# Patient Record
Sex: MALE | Race: WHITE | NOT HISPANIC OR LATINO | Employment: FULL TIME | URBAN - METROPOLITAN AREA
[De-identification: names, ages, dates, MRNs, and addresses within clinical notes are randomized per-mention and may not be internally consistent; named-entity substitution may affect disease eponyms.]

---

## 2023-11-17 ENCOUNTER — OFFICE VISIT (OUTPATIENT)
Dept: URGENT CARE | Facility: URGENT CARE | Age: 28
End: 2023-11-17
Payer: COMMERCIAL

## 2023-11-17 ENCOUNTER — ANCILLARY PROCEDURE (OUTPATIENT)
Dept: GENERAL RADIOLOGY | Facility: CLINIC | Age: 28
End: 2023-11-17
Attending: PHYSICIAN ASSISTANT
Payer: COMMERCIAL

## 2023-11-17 ENCOUNTER — VIRTUAL VISIT (OUTPATIENT)
Dept: URGENT CARE | Facility: CLINIC | Age: 28
End: 2023-11-17
Payer: COMMERCIAL

## 2023-11-17 VITALS
HEART RATE: 74 BPM | SYSTOLIC BLOOD PRESSURE: 144 MMHG | DIASTOLIC BLOOD PRESSURE: 76 MMHG | OXYGEN SATURATION: 97 % | TEMPERATURE: 97.8 F

## 2023-11-17 DIAGNOSIS — K59.00 CONSTIPATION, UNSPECIFIED CONSTIPATION TYPE: ICD-10-CM

## 2023-11-17 DIAGNOSIS — R10.84 ABDOMINAL PAIN, GENERALIZED: Primary | ICD-10-CM

## 2023-11-17 DIAGNOSIS — R10.84 ABDOMINAL PAIN, GENERALIZED: ICD-10-CM

## 2023-11-17 LAB
ALBUMIN SERPL-MCNC: 4 G/DL (ref 3.4–5)
ALBUMIN UR-MCNC: NEGATIVE MG/DL
ALP SERPL-CCNC: 66 U/L (ref 40–150)
ALT SERPL W P-5'-P-CCNC: 19 U/L (ref 0–70)
ANION GAP SERPL CALCULATED.3IONS-SCNC: 7 MMOL/L (ref 3–14)
APPEARANCE UR: CLEAR
AST SERPL W P-5'-P-CCNC: 22 U/L (ref 0–45)
BILIRUB SERPL-MCNC: 1 MG/DL (ref 0.2–1.3)
BILIRUB UR QL STRIP: NEGATIVE
BUN SERPL-MCNC: 12 MG/DL (ref 7–30)
CALCIUM SERPL-MCNC: 9.7 MG/DL (ref 8.5–10.1)
CHLORIDE BLD-SCNC: 104 MMOL/L (ref 94–109)
CO2 SERPL-SCNC: 32 MMOL/L (ref 20–32)
COLOR UR AUTO: YELLOW
CREAT SERPL-MCNC: 1 MG/DL (ref 0.66–1.25)
EGFRCR SERPLBLD CKD-EPI 2021: >90 ML/MIN/1.73M2
ERYTHROCYTE [DISTWIDTH] IN BLOOD BY AUTOMATED COUNT: 11.9 % (ref 10–15)
GLUCOSE BLD-MCNC: 104 MG/DL (ref 70–99)
GLUCOSE UR STRIP-MCNC: NEGATIVE MG/DL
HCT VFR BLD AUTO: 50.5 % (ref 40–53)
HGB BLD-MCNC: 17.1 G/DL (ref 13.3–17.7)
HGB UR QL STRIP: NEGATIVE
KETONES UR STRIP-MCNC: NEGATIVE MG/DL
LEUKOCYTE ESTERASE UR QL STRIP: NEGATIVE
MCH RBC QN AUTO: 29.5 PG (ref 26.5–33)
MCHC RBC AUTO-ENTMCNC: 33.9 G/DL (ref 31.5–36.5)
MCV RBC AUTO: 87 FL (ref 78–100)
NITRATE UR QL: NEGATIVE
PH UR STRIP: 7.5 [PH] (ref 5–7)
PLATELET # BLD AUTO: 247 10E3/UL (ref 150–450)
POTASSIUM BLD-SCNC: 3.8 MMOL/L (ref 3.4–5.3)
PROT SERPL-MCNC: 7.6 G/DL (ref 6.8–8.8)
RBC # BLD AUTO: 5.79 10E6/UL (ref 4.4–5.9)
SODIUM SERPL-SCNC: 143 MMOL/L (ref 133–144)
SP GR UR STRIP: 1.01 (ref 1–1.03)
UROBILINOGEN UR STRIP-ACNC: 0.2 E.U./DL
WBC # BLD AUTO: 4.8 10E3/UL (ref 4–11)

## 2023-11-17 PROCEDURE — 74019 RADEX ABDOMEN 2 VIEWS: CPT | Mod: TC | Performed by: RADIOLOGY

## 2023-11-17 PROCEDURE — 85027 COMPLETE CBC AUTOMATED: CPT | Performed by: PHYSICIAN ASSISTANT

## 2023-11-17 PROCEDURE — 36415 COLL VENOUS BLD VENIPUNCTURE: CPT | Performed by: PHYSICIAN ASSISTANT

## 2023-11-17 PROCEDURE — 81003 URINALYSIS AUTO W/O SCOPE: CPT | Performed by: PHYSICIAN ASSISTANT

## 2023-11-17 PROCEDURE — 99203 OFFICE O/P NEW LOW 30 MIN: CPT | Performed by: PHYSICIAN ASSISTANT

## 2023-11-17 PROCEDURE — 80053 COMPREHEN METABOLIC PANEL: CPT | Performed by: PHYSICIAN ASSISTANT

## 2023-11-17 RX ORDER — DEXTROAMPHETAMINE SACCHARATE, AMPHETAMINE ASPARTATE, DEXTROAMPHETAMINE SULFATE AND AMPHETAMINE SULFATE 5; 5; 5; 5 MG/1; MG/1; MG/1; MG/1
2 TABLET ORAL
COMMUNITY
Start: 2023-11-06

## 2023-11-17 NOTE — PROGRESS NOTES
Assessment & Plan     1. Abdominal pain, generalized  - UA Macroscopic with reflex to Microscopic and Culture - Clinic Collect  - XR Abdomen 2 Views; Future  - CBC with platelets; Future  - Comprehensive metabolic panel; Future  - CBC with platelets  - Comprehensive metabolic panel    2. Constipation, unspecified constipation type    No laboratory evidence of leukocytosis, anemia, FRANSISCO, renal insufficiency, hepatic dysfunction. XR shows some stool burden in the colon. I suspect that patient symptoms are secondary to constipation.  Advised using miralax until stools are soft and regular. OK to continue with antacid.     Return in about 1 week (around 11/24/2023), or if symptoms worsen or fail to improve.    Diagnosis and treatment plan was reviewed with patient and/or family.   We went over any labs or imaging. Discussed worsening symptoms or little to no relief despite treatment plan to follow-up with PCP or return to clinic.  Patient verbalizes understanding. All questions were addressed and answered.     Keiko Frances PA-C  [facility] [place]    CHIEF COMPLAINT:   Chief Complaint   Patient presents with    Urgent Care     Pt states that he has had a stomach ache since about 2 weeks now. Pt states that he has been recovering form covid previously as well and has been taking a lot of ibuprofen. Pt states that he has also been having loose stool for about 2 weeks now also. His appetite has been normal.     Jerry Santoyo is a 28 year old male who presents to clinic today for evaluation of abdominal pain. Pain is located in the lower abdomen. Symptoms started about 2 weeks ago.   Located in the lower abdomen.   +constipation. Three hard stools today.   NO blood in stool.   He has taken Miralax, which helped his symptoms.   No vomiting. No dysuria or hematuria. He has not had fever or chills.   Pain is not made any worse with food or what he eats.       No past medical history on file.  No  past surgical history on file.  Social History     Tobacco Use    Smoking status: Never    Smokeless tobacco: Not on file   Substance Use Topics    Alcohol use: Not on file     Current Outpatient Medications   Medication    amphetamine-dextroamphetamine (ADDERALL) 20 MG tablet    EPIDUO 0.1-2.5 % EX GEL    UNKNOWN MED DOSAGE     No current facility-administered medications for this visit.     Allergies   Allergen Reactions    No Known Allergies        10 point ROS of systems were all negative except for pertinent positives noted in my HPI.      Exam:   BP (!) 144/76 (BP Location: Right arm, Patient Position: Sitting, Cuff Size: Adult Regular)   Pulse 74   Temp 97.8  F (36.6  C) (Tympanic)   SpO2 97%   Constitutional: healthy, alert and no distress  Head: Normocephalic, atraumatic.  Eyes: conjunctiva clear, no drainage  ENT: TMs clear and shiny emily, nasal mucosa pink and moist, throat without tonsillar hypertrophy or erythema  Neck: neck is supple, no cervical lymphadenopathy or nuchal rigidity  Cardiovascular: RRR  Respiratory: CTA bilaterally, no rhonchi or rales  Gastrointestinal: soft and nontender. No rebound, guarding or rigidity.   Skin: no rashes  Neurologic: Speech clear, gait normal. Moves all extremities.    Results for orders placed or performed in visit on 11/17/23   XR Abdomen 2 Views     Status: None    Narrative    XR ABDOMEN 2 VIEWS   11/17/2023 2:26 PM     HISTORY: Lower abdominal pain, constipation; Abdominal pain,  generalized    COMPARISON: None.      Impression    IMPRESSION: No free air. Nonobstructive bowel gas pattern. Moderate  amount of formed stool throughout the colon.    REE MARTINEZ MD         SYSTEM ID:  [id number]   Results for orders placed or performed in visit on 11/17/23   UA Macroscopic with reflex to Microscopic and Culture - Clinic Collect     Status: Abnormal    Specimen: Urine, Midstream   Result Value Ref Range    Color Urine Yellow Colorless, Straw, Light Yellow, Yellow     Appearance Urine Clear Clear    Glucose Urine Negative Negative mg/dL    Bilirubin Urine Negative Negative    Ketones Urine Negative Negative mg/dL    Specific Gravity Urine 1.010 1.003 - 1.035    Blood Urine Negative Negative    pH Urine 7.5 (H) 5.0 - 7.0    Protein Albumin Urine Negative Negative mg/dL    Urobilinogen Urine 0.2 0.2, 1.0 E.U./dL    Nitrite Urine Negative Negative    Leukocyte Esterase Urine Negative Negative    Narrative    Microscopic not indicated   CBC with platelets     Status: Normal   Result Value Ref Range    WBC Count 4.8 4.0 - 11.0 10e3/uL    RBC Count 5.79 4.40 - 5.90 10e6/uL    Hemoglobin 17.1 13.3 - 17.7 g/dL    Hematocrit 50.5 40.0 - 53.0 %    MCV 87 78 - 100 fL    MCH 29.5 26.5 - 33.0 pg    MCHC 33.9 31.5 - 36.5 g/dL    RDW 11.9 10.0 - 15.0 %    Platelet Count 247 150 - 450 10e3/uL   Comprehensive metabolic panel     Status: Abnormal   Result Value Ref Range    Sodium 143 133 - 144 mmol/L    Potassium 3.8 3.4 - 5.3 mmol/L    Chloride 104 94 - 109 mmol/L    Carbon Dioxide (CO2) 32 20 - 32 mmol/L    Anion Gap 7 3 - 14 mmol/L    Urea Nitrogen 12 7 - 30 mg/dL    Creatinine 1.00 0.66 - 1.25 mg/dL    GFR Estimate >90 >60 mL/min/1.73m2    Calcium 9.7 8.5 - 10.1 mg/dL    Glucose 104 (H) 70 - 99 mg/dL    Alkaline Phosphatase 66 40 - 150 U/L    AST 22 0 - 45 U/L    ALT 19 0 - 70 U/L    Protein Total 7.6 6.8 - 8.8 g/dL    Albumin 4.0 3.4 - 5.0 g/dL    Bilirubin Total 1.0 0.2 - 1.3 mg/dL

## 2023-11-17 NOTE — PATIENT INSTRUCTIONS
I suspect that your abdominal pain is coming from constipation.    Take MiraLAX for the next several days, until you have stools that are soft and, at regular intervals.  Fluids throughout the day.    If you feel like the antacid has helped your symptoms over the past several days, okay to continue this for the next 1 to 2 weeks.  Typically signs of an ulcer or GERD would come with upper abdominal pain, or reflux symptoms, which she has not been experiencing.

## 2024-01-07 ENCOUNTER — HEALTH MAINTENANCE LETTER (OUTPATIENT)
Age: 29
End: 2024-01-07

## 2024-01-24 ENCOUNTER — TELEPHONE (OUTPATIENT)
Dept: PEDIATRICS | Facility: CLINIC | Age: 29
End: 2024-01-24

## 2024-01-24 NOTE — TELEPHONE ENCOUNTER
Call placed to pt with message from provider  LM to return call to the clinic    Shannon Harp RN on 1/24/2024 at 11:42 AM

## 2024-01-24 NOTE — TELEPHONE ENCOUNTER
Please call and check in on pt- he's had 3 appointments in 2024 and late cancelled on at least 2 of them. Is he doing okay? Are there things we can help with that are leading to the late cancels?     JAISON Catalan MD  Internal Medicine-Pediatrics

## 2024-01-25 NOTE — TELEPHONE ENCOUNTER
Call placed to pt   He is feeling much better  He has started taking a probiotic and a gentle GI detox  Pt did not want to schedule an appt at this time    Shannon Harp RN on 1/25/2024 at 11:09 AM

## 2024-03-11 ENCOUNTER — HOSPITAL ENCOUNTER (EMERGENCY)
Facility: CLINIC | Age: 29
Discharge: HOME | End: 2024-03-11
Attending: EMERGENCY MEDICINE | Admitting: EMERGENCY MEDICINE
Payer: COMMERCIAL

## 2024-03-11 VITALS
TEMPERATURE: 98.6 F | HEART RATE: 100 BPM | OXYGEN SATURATION: 98 % | SYSTOLIC BLOOD PRESSURE: 135 MMHG | DIASTOLIC BLOOD PRESSURE: 90 MMHG | RESPIRATION RATE: 16 BRPM

## 2024-03-11 DIAGNOSIS — R07.89 CHEST PAIN, NON-CARDIAC: ICD-10-CM

## 2024-03-11 LAB
ANION GAP SERPL CALCULATED.3IONS-SCNC: 10 MMOL/L (ref 7–15)
ATRIAL RATE - MUSE: 80 BPM
BASOPHILS # BLD AUTO: 0 10E3/UL (ref 0–0.2)
BASOPHILS NFR BLD AUTO: 0 %
BUN SERPL-MCNC: 11.9 MG/DL (ref 6–20)
CALCIUM SERPL-MCNC: 9.3 MG/DL (ref 8.6–10)
CHLORIDE SERPL-SCNC: 102 MMOL/L (ref 98–107)
CREAT SERPL-MCNC: 0.91 MG/DL (ref 0.67–1.17)
D DIMER PPP FEU-MCNC: <0.27 UG/ML FEU (ref 0–0.5)
DEPRECATED HCO3 PLAS-SCNC: 26 MMOL/L (ref 22–29)
DIASTOLIC BLOOD PRESSURE - MUSE: NORMAL MMHG
EGFRCR SERPLBLD CKD-EPI 2021: >90 ML/MIN/1.73M2
EOSINOPHIL # BLD AUTO: 0 10E3/UL (ref 0–0.7)
EOSINOPHIL NFR BLD AUTO: 1 %
ERYTHROCYTE [DISTWIDTH] IN BLOOD BY AUTOMATED COUNT: 12.2 % (ref 10–15)
GLUCOSE SERPL-MCNC: 101 MG/DL (ref 70–99)
HCT VFR BLD AUTO: 48 % (ref 40–53)
HGB BLD-MCNC: 16.7 G/DL (ref 13.3–17.7)
HOLD SPECIMEN: NORMAL
HOLD SPECIMEN: NORMAL
IMM GRANULOCYTES # BLD: 0 10E3/UL
IMM GRANULOCYTES NFR BLD: 0 %
INTERPRETATION ECG - MUSE: NORMAL
LYMPHOCYTES # BLD AUTO: 1.1 10E3/UL (ref 0.8–5.3)
LYMPHOCYTES NFR BLD AUTO: 13 %
MCH RBC QN AUTO: 30.6 PG (ref 26.5–33)
MCHC RBC AUTO-ENTMCNC: 34.8 G/DL (ref 31.5–36.5)
MCV RBC AUTO: 88 FL (ref 78–100)
MONOCYTES # BLD AUTO: 0.4 10E3/UL (ref 0–1.3)
MONOCYTES NFR BLD AUTO: 5 %
NEUTROPHILS # BLD AUTO: 6.9 10E3/UL (ref 1.6–8.3)
NEUTROPHILS NFR BLD AUTO: 81 %
NRBC # BLD AUTO: 0 10E3/UL
NRBC BLD AUTO-RTO: 0 /100
P AXIS - MUSE: 68 DEGREES
PLATELET # BLD AUTO: 239 10E3/UL (ref 150–450)
POTASSIUM SERPL-SCNC: 4 MMOL/L (ref 3.4–5.3)
PR INTERVAL - MUSE: 190 MS
QRS DURATION - MUSE: 100 MS
QT - MUSE: 386 MS
QTC - MUSE: 445 MS
R AXIS - MUSE: -11 DEGREES
RBC # BLD AUTO: 5.46 10E6/UL (ref 4.4–5.9)
SODIUM SERPL-SCNC: 138 MMOL/L (ref 135–145)
SYSTOLIC BLOOD PRESSURE - MUSE: NORMAL MMHG
T AXIS - MUSE: 43 DEGREES
TROPONIN T SERPL HS-MCNC: <6 NG/L
VENTRICULAR RATE- MUSE: 80 BPM
WBC # BLD AUTO: 8.6 10E3/UL (ref 4–11)

## 2024-03-11 PROCEDURE — 99284 EMERGENCY DEPT VISIT MOD MDM: CPT

## 2024-03-11 PROCEDURE — 80048 BASIC METABOLIC PNL TOTAL CA: CPT | Performed by: EMERGENCY MEDICINE

## 2024-03-11 PROCEDURE — 85379 FIBRIN DEGRADATION QUANT: CPT | Performed by: EMERGENCY MEDICINE

## 2024-03-11 PROCEDURE — 93005 ELECTROCARDIOGRAM TRACING: CPT

## 2024-03-11 PROCEDURE — 36415 COLL VENOUS BLD VENIPUNCTURE: CPT | Performed by: EMERGENCY MEDICINE

## 2024-03-11 PROCEDURE — 84484 ASSAY OF TROPONIN QUANT: CPT | Performed by: EMERGENCY MEDICINE

## 2024-03-11 PROCEDURE — 85025 COMPLETE CBC W/AUTO DIFF WBC: CPT | Performed by: EMERGENCY MEDICINE

## 2024-03-11 ASSESSMENT — COLUMBIA-SUICIDE SEVERITY RATING SCALE - C-SSRS
6. HAVE YOU EVER DONE ANYTHING, STARTED TO DO ANYTHING, OR PREPARED TO DO ANYTHING TO END YOUR LIFE?: NO
2. HAVE YOU ACTUALLY HAD ANY THOUGHTS OF KILLING YOURSELF IN THE PAST MONTH?: NO
1. IN THE PAST MONTH, HAVE YOU WISHED YOU WERE DEAD OR WISHED YOU COULD GO TO SLEEP AND NOT WAKE UP?: NO

## 2024-03-11 ASSESSMENT — ACTIVITIES OF DAILY LIVING (ADL)
ADLS_ACUITY_SCORE: 33
ADLS_ACUITY_SCORE: 33

## 2024-03-11 NOTE — ED TRIAGE NOTES
Left sided chest pain that started 1 hour PTA and lasted about 10 minutes. No chest pain presently in triage. Never happened before.

## 2024-03-11 NOTE — ED PROVIDER NOTES
History     Chief Complaint:  Chest Pain       HPI   Natanael Brooks is a 29 year old male with history of anxiety who presents with wife for evaluation of left chest pain. The patient states yesterday he experienced an episode of twitching in his left pectoralis major. Then today at approximately 1332 he was taking a shower when he reports sharp intermittent pain in his left pectoralis major for around a minute. When he got out of the shower he experienced dull left arm pain and numbness in both his hands. He adds feeling a little shortness of breath. Denies nausea, dizziness, fever, chills, leg swelling, and calf pain. Denies history of smoking, blood clot in the leg, and family history of heart attack under age 50.      Independent Historian:    None - Patient    Review of External Notes:  None    Medications:    The patient is not currently taking any prescribed medications.     Past Medical History:    ADHD  Anxiety      Past Surgical History:    Umbilical hernia repair       Physical Exam   Patient Vitals for the past 24 hrs:   BP Temp Pulse Resp SpO2   03/11/24 1452 (!) 135/90 -- -- -- --   03/11/24 1451 -- 98.6  F (37  C) 100 16 98 %        Physical Exam  General: alert, seated comfortably on a chair  HENT: mucous membranes moist  CV: regular rate, regular rhythm, no murmurs rubs or gallops, 2+ radial pulses.  Resp: normal effort, clear throughout, no crackles or wheezing  GI: abdomen soft and nontender, no guarding  MSK: no bony tenderness  Skin: appropriately warm and dry  Extremities: no edema, calves non-tender  Neuro: alert, clear speech, oriented  Psych: normal mood and affect      Emergency Department Course     ECG results from 03/11/24   EKG 12-lead, tracing only     Value    Systolic Blood Pressure     Diastolic Blood Pressure     Ventricular Rate 80    Atrial Rate 80    GA Interval 190    QRS Duration 100        QTc 445    P Axis 68    R AXIS -11    T Axis 43    Interpretation ECG       Sinus rhythm  Normal ECG  No previous ECGs available  Unconfirmed report - interpretation of this ECG is computer generated - see medical record for final interpretation  Confirmed by - EMERGENCY ROOM, PHYSICIAN ([id number]),  Gurpreet Hayes ([id number]) on 3/11/2024 3:28:30 PM           Imaging:  No orders to display       Laboratory:  Labs Ordered and Resulted from Time of ED Arrival to Time of ED Departure   BASIC METABOLIC PANEL - Abnormal       Result Value    Sodium 138      Potassium 4.0      Chloride 102      Carbon Dioxide (CO2) 26      Anion Gap 10      Urea Nitrogen 11.9      Creatinine 0.91      GFR Estimate >90      Calcium 9.3      Glucose 101 (*)    TROPONIN T, HIGH SENSITIVITY - Normal    Troponin T, High Sensitivity <6     D DIMER QUANTITATIVE - Normal    D-Dimer Quantitative <0.27     CBC WITH PLATELETS AND DIFFERENTIAL    WBC Count 8.6      RBC Count 5.46      Hemoglobin 16.7      Hematocrit 48.0      MCV 88      MCH 30.6      MCHC 34.8      RDW 12.2      Platelet Count 239      % Neutrophils 81      % Lymphocytes 13      % Monocytes 5      % Eosinophils 1      % Basophils 0      % Immature Granulocytes 0      NRBCs per 100 WBC 0      Absolute Neutrophils 6.9      Absolute Lymphocytes 1.1      Absolute Monocytes 0.4      Absolute Eosinophils 0.0      Absolute Basophils 0.0      Absolute Immature Granulocytes 0.0      Absolute NRBCs 0.0          Emergency Department Course & Assessments:    Interventions:  Medications - No data to display     Assessments:  1713 I obtained history and performed physical exam as noted above.   1831 I rechecked and updated the patient regarding discharge.    Independent Interpretation (X-rays, CTs, rhythm strip):  None    Consultations/Discussion of Management or Tests:  None       Social Determinants of Health affecting care:  None     Disposition:  The patient was discharged.    Impression & Plan        Medical Decision Making:  Natanaelzaira Brooks is a 29 year old male who  presents with chest pain.  Exam, patient is very slightly hypertensive, but otherwise has normal vitals.  Exam is reassuring, with bilateral symmetric pulses, calves are nontender.  The work up in the Emergency Department is negative.  I do not suspect PE, given patient has negative D-dimer and is otherwise low risk.  Do not suspect aortic dissection, given bilateral symmetric pulses, atypical history, and negative D-dimer.   No serious etiology for the chest pain were detected today during this visit.  Given s/he is low risk, negative d-dimer was adequate to rule out PE.  He is very low risk for ACS, and given atypical symptoms, provocative testing is not indicated.  Close follow up with primary care is indicated should the pain continue, as further work up may be performed; this was made clear to the patient, who understands.      Critical Care time:  was 0 minutes for this patient excluding procedures.    Diagnosis:    ICD-10-CM    1. Chest pain, non-cardiac  R07.89          Scribe Disclosure:  IWillie, am serving as a scribe at 5:16 PM on 3/11/2024 to document services personally performed by Ilda Hutchison MD based on my observations and the provider's statements to me.    3/11/2024   Ilda Hutchison MD Pepper, Tracy Lynn, MD  03/12/24 0324

## 2024-09-24 ENCOUNTER — VIRTUAL VISIT (OUTPATIENT)
Dept: INTERNAL MEDICINE | Facility: CLINIC | Age: 29
End: 2024-09-24
Payer: COMMERCIAL

## 2024-09-24 DIAGNOSIS — J32.9 SINUSITIS, UNSPECIFIED CHRONICITY, UNSPECIFIED LOCATION: Primary | ICD-10-CM

## 2024-09-24 PROCEDURE — 99214 OFFICE O/P EST MOD 30 MIN: CPT | Mod: 95 | Performed by: NURSE PRACTITIONER

## 2024-09-24 NOTE — PROGRESS NOTES
Natanael is a 29 year old who is being evaluated via a billable video visit.    How would you like to obtain your AVS? MyChart  If the video visit is dropped, the invitation should be resent by: Text to cell phone: [phone]  Will anyone else be joining your video visit? No      Assessment & Plan     Sinusitis, unspecified chronicity, unspecified location  Discussed  - amoxicillin-clavulanate (AUGMENTIN) 875-125 MG tablet; Take 1 tablet by mouth 2 times daily.            Patient Instructions   Flonase daily     Afrin only for 3 days     Sudafed decongestant     Augmentin twice daily for 10 days if not better after the above actions - usually we wait 7-10 days from onset of symptoms, but symptoms are worsening could start     If you do test positive for COVID would consider Paxlovid treatment if you choose to do it     Subjective   Natanael is a 29 year old, presenting for the following health issues:  Sinus Problem      9/24/2024     1:09 PM   Additional Questions   Roomed by Kasey NICE CMA     Sinus Problem     History of Present Illness       Reason for visit:  Sinus Infection   He is taking medications regularly.     He gets sinus infections yearly and usually he can take care of them with Sudafed or Mucinex or Flonase  His symptoms started on Sunday and yesterday they increased in severity and today they are worse.  He has colored secretions when he blows his nose    Discussed usually sinus-itis is viral and usually we wait 7 to 10 days after start of symptoms to treat with an antibiotic.  Symptomatic treatment with Flonase Mucinex Sudafed or a 3-day course of Afrin are all acceptable things to do.  If not improving I am giving him an antibiotic because he saying is worsening over time    I did ask him to check COVID -he has not done that to this point, because if COVID is positive then that would be treated with Paxlovid if he wants to do that.  Asked him to check in send me a message if he wants to be treated if  "he test positive        Review of Systems  Constitutional, neuro, ENT, endocrine, pulmonary, cardiac, gastrointestinal, genitourinary, musculoskeletal, integument and psychiatric systems are negative, except as otherwise noted.      Objective    Vitals - Patient Reported  Weight (Patient Reported): 78 kg (172 lb)  Height (Patient Reported): 172.7 cm (5' 8\")  BMI (Based on Pt Reported Ht/Wt): 26.15        Physical Exam   GENERAL: alert and sounds congested  EYES: Eyes grossly normal to inspection.  No discharge or erythema, or obvious scleral/conjunctival abnormalities.  RESP: No audible wheeze, cough, or visible cyanosis.    SKIN: Visible skin clear. No significant rash, abnormal pigmentation or lesions.  NEURO: Cranial nerves grossly intact.  Mentation and speech appropriate for age.  PSYCH: Appropriate affect, tone, and pace of words          Video-Visit Details    Type of service:  Video Visit   Originating Location (pt. Location): Home    Distant Location (provider location):  On-site  Platform used for Video Visit: Madisyn  Signed Electronically by: JOSE J Mukherjee CNP    "

## 2024-09-24 NOTE — PATIENT INSTRUCTIONS
Flonase daily     Afrin only for 3 days     Sudafed decongestant     Augmentin twice daily for 10 days if not better after the above actions - usually we wait 7-10 days from onset of symptoms, but symptoms are worsening could start     If you do test positive for COVID would consider Paxlovid treatment if you choose to do it

## 2025-01-25 ENCOUNTER — HEALTH MAINTENANCE LETTER (OUTPATIENT)
Age: 30
End: 2025-01-25

## 2025-09-03 ENCOUNTER — OFFICE VISIT (OUTPATIENT)
Dept: URGENT CARE | Facility: URGENT CARE | Age: 30
End: 2025-09-03
Payer: COMMERCIAL

## 2025-09-03 VITALS
SYSTOLIC BLOOD PRESSURE: 130 MMHG | TEMPERATURE: 98 F | OXYGEN SATURATION: 98 % | RESPIRATION RATE: 16 BRPM | HEART RATE: 95 BPM | DIASTOLIC BLOOD PRESSURE: 63 MMHG | BODY MASS INDEX: 25.78 KG/M2 | WEIGHT: 170.1 LBS | HEIGHT: 68 IN

## 2025-09-03 DIAGNOSIS — M54.50 ACUTE RIGHT-SIDED LOW BACK PAIN WITHOUT SCIATICA: Primary | ICD-10-CM

## 2025-09-03 PROCEDURE — 99213 OFFICE O/P EST LOW 20 MIN: CPT | Performed by: PHYSICIAN ASSISTANT

## 2025-09-03 PROCEDURE — 3075F SYST BP GE 130 - 139MM HG: CPT | Performed by: PHYSICIAN ASSISTANT

## 2025-09-03 PROCEDURE — 3078F DIAST BP <80 MM HG: CPT | Performed by: PHYSICIAN ASSISTANT

## 2025-09-03 RX ORDER — DICLOFENAC SODIUM 75 MG/1
75 TABLET, DELAYED RELEASE ORAL 2 TIMES DAILY
Qty: 14 TABLET | Refills: 0 | Status: SHIPPED | OUTPATIENT
Start: 2025-09-03